# Patient Record
Sex: FEMALE | Race: BLACK OR AFRICAN AMERICAN | NOT HISPANIC OR LATINO | Employment: UNEMPLOYED | URBAN - METROPOLITAN AREA
[De-identification: names, ages, dates, MRNs, and addresses within clinical notes are randomized per-mention and may not be internally consistent; named-entity substitution may affect disease eponyms.]

---

## 2017-02-13 ENCOUNTER — HISTORICAL (OUTPATIENT)
Dept: LAB | Facility: HOSPITAL | Age: 22
End: 2017-02-13

## 2018-10-23 ENCOUNTER — HISTORICAL (OUTPATIENT)
Dept: LAB | Facility: HOSPITAL | Age: 23
End: 2018-10-23

## 2018-11-20 ENCOUNTER — HISTORICAL (OUTPATIENT)
Dept: LAB | Facility: HOSPITAL | Age: 23
End: 2018-11-20

## 2018-12-19 ENCOUNTER — HISTORICAL (OUTPATIENT)
Dept: LAB | Facility: HOSPITAL | Age: 23
End: 2018-12-19

## 2019-01-24 ENCOUNTER — HISTORICAL (OUTPATIENT)
Dept: ADMINISTRATIVE | Facility: HOSPITAL | Age: 24
End: 2019-01-24

## 2019-01-24 LAB
ABS NEUT (OLG): 8.85 X10(3)/MCL (ref 2.1–9.2)
BASOPHILS # BLD AUTO: 0 X10(3)/MCL (ref 0–0.2)
BASOPHILS NFR BLD AUTO: 0 %
EOSINOPHIL # BLD AUTO: 0.1 X10(3)/MCL (ref 0–0.9)
EOSINOPHIL NFR BLD AUTO: 1 %
ERYTHROCYTE [DISTWIDTH] IN BLOOD BY AUTOMATED COUNT: 13.6 % (ref 11.5–17)
GLUCOSE 1H P 100 G GLC PO SERPL-MCNC: 103 MG/DL (ref 100–180)
HCT VFR BLD AUTO: 35.2 % (ref 37–47)
HGB BLD-MCNC: 11.1 GM/DL (ref 12–16)
LYMPHOCYTES # BLD AUTO: 1 X10(3)/MCL (ref 0.6–4.6)
LYMPHOCYTES NFR BLD AUTO: 10 %
MCH RBC QN AUTO: 27.4 PG (ref 27–31)
MCHC RBC AUTO-ENTMCNC: 31.5 GM/DL (ref 33–36)
MCV RBC AUTO: 86.9 FL (ref 80–94)
MONOCYTES # BLD AUTO: 0.7 X10(3)/MCL (ref 0.1–1.3)
MONOCYTES NFR BLD AUTO: 6 %
NEUTROPHILS # BLD AUTO: 8.85 X10(3)/MCL (ref 2.1–9.2)
NEUTROPHILS NFR BLD AUTO: 82 %
PLATELET # BLD AUTO: 265 X10(3)/MCL (ref 130–400)
PMV BLD AUTO: 10.9 FL (ref 9.4–12.4)
RBC # BLD AUTO: 4.05 X10(6)/MCL (ref 4.2–5.4)
WBC # SPEC AUTO: 10.7 X10(3)/MCL (ref 4.5–11.5)

## 2019-03-12 ENCOUNTER — HISTORICAL (OUTPATIENT)
Dept: LAB | Facility: HOSPITAL | Age: 24
End: 2019-03-12

## 2019-03-12 LAB — T PALLIDUM AB SER QL: NONREACTIVE

## 2019-04-09 ENCOUNTER — HISTORICAL (OUTPATIENT)
Dept: LAB | Facility: HOSPITAL | Age: 24
End: 2019-04-09

## 2019-04-09 LAB
AMPHET UR QL SCN: ABNORMAL
BARBITURATE SCN PRESENT UR: ABNORMAL
BENZODIAZ UR QL SCN: ABNORMAL
CANNABINOIDS UR QL SCN: ABNORMAL
COCAINE UR QL SCN: ABNORMAL
OPIATES UR QL SCN: ABNORMAL
PCP UR QL: ABNORMAL
PH UR STRIP.AUTO: 8 [PH] (ref 5–7.5)
SP GR FLD REFRACTOMETRY: 1.02 (ref 1–1.03)

## 2022-04-07 ENCOUNTER — HISTORICAL (OUTPATIENT)
Dept: ADMINISTRATIVE | Facility: HOSPITAL | Age: 27
End: 2022-04-07

## 2022-04-23 VITALS
WEIGHT: 165.38 LBS | DIASTOLIC BLOOD PRESSURE: 93 MMHG | HEIGHT: 64 IN | BODY MASS INDEX: 28.24 KG/M2 | SYSTOLIC BLOOD PRESSURE: 138 MMHG

## 2022-05-17 ENCOUNTER — HOSPITAL ENCOUNTER (EMERGENCY)
Facility: HOSPITAL | Age: 27
Discharge: HOME | End: 2022-05-17
Attending: STUDENT IN AN ORGANIZED HEALTH CARE EDUCATION/TRAINING PROGRAM
Payer: MEDICAID

## 2022-05-17 VITALS
TEMPERATURE: 98 F | HEIGHT: 64 IN | HEART RATE: 67 BPM | OXYGEN SATURATION: 100 % | WEIGHT: 204.81 LBS | SYSTOLIC BLOOD PRESSURE: 139 MMHG | RESPIRATION RATE: 18 BRPM | DIASTOLIC BLOOD PRESSURE: 82 MMHG | BODY MASS INDEX: 34.97 KG/M2

## 2022-05-17 DIAGNOSIS — Z48.02 VISIT FOR SUTURE REMOVAL: Primary | ICD-10-CM

## 2022-05-17 PROCEDURE — 99281 EMR DPT VST MAYX REQ PHY/QHP: CPT | Mod: 25

## 2022-05-17 NOTE — ED NOTES
Sutures to palmar aspect of right hand appear to be well healed and no signs/symptoms of infection

## 2022-05-17 NOTE — ED PROVIDER NOTES
Encounter Date: 5/17/2022       History     Chief Complaint   Patient presents with    Suture / Staple Removal     Requesting suture removal to right palm. States have been in place approx 3 weeks.      Patient presents for suture removal; she denies any bleeding/drainage/fever    The history is provided by the patient.   Suture / Staple Removal   The sutures were placed more than 14 days ago. Treatments since wound repair include regular soap and water washings. There has been no drainage from the wound. There is no redness present. There is no swelling present. There is no pain present. She has no difficulty moving the affected extremity or digit.     Review of patient's allergies indicates:   Allergen Reactions    Sulfamethoxazole-trimethoprim Rash     No past medical history on file.  No past surgical history on file.  No family history on file.     Review of Systems   Constitutional: Negative for fever.   HENT: Negative for sore throat.    Eyes: Negative.    Respiratory: Negative for shortness of breath.    Cardiovascular: Negative for chest pain.   Gastrointestinal: Negative for nausea.   Genitourinary: Negative for dysuria.   Musculoskeletal: Negative for back pain.   Skin: Negative for rash.   Neurological: Negative for weakness.   Hematological: Does not bruise/bleed easily.   Psychiatric/Behavioral: Negative.        Physical Exam     Initial Vitals [05/17/22 1534]   BP Pulse Resp Temp SpO2   139/82 67 18 98.4 °F (36.9 °C) 100 %      MAP       --         Physical Exam    Constitutional: She appears well-developed and well-nourished.   HENT:   Head: Normocephalic and atraumatic.   Eyes: EOM are normal.   Neck: Neck supple.   Normal range of motion.  Cardiovascular: Normal rate and regular rhythm.   Pulmonary/Chest: Breath sounds normal.   Abdominal: Abdomen is soft. Bowel sounds are normal.   Musculoskeletal:         General: Normal range of motion.      Right hand: Laceration present.      Cervical back:  Normal range of motion and neck supple.      Comments: Healed laceration site; sutures in place; all removed without complication     Neurological: She is alert and oriented to person, place, and time.   Skin: Skin is warm and dry.   Psychiatric: She has a normal mood and affect. Her behavior is normal. Judgment and thought content normal.         ED Course   Procedures  Labs Reviewed - No data to display       Imaging Results    None          Medications - No data to display                       Clinical Impression:   Final diagnoses:  [Z48.02] Visit for suture removal (Primary)          ED Disposition Condition    Discharge Stable        ED Prescriptions     None        Follow-up Information     Follow up With Specialties Details Why Contact Info    discharge info    Discussed all pertinent ED information, results, diagnosis and treatment plan; All questions and concerns were addressed at this time. Patient voices understanding of information and instructions. Patient is comfortable with plan and discharge    discharge followup    If your symptoms become WORSE or you DO NOT IMPROVE and you are unable to reach your health care provider, you should RETURN to the emergency department           MARISOL Waters  05/17/22 2317

## 2022-08-03 ENCOUNTER — OFFICE VISIT (OUTPATIENT)
Dept: URGENT CARE | Facility: CLINIC | Age: 27
End: 2022-08-03
Payer: MEDICAID

## 2022-08-03 ENCOUNTER — TELEPHONE (OUTPATIENT)
Dept: URGENT CARE | Facility: CLINIC | Age: 27
End: 2022-08-03

## 2022-08-03 VITALS
OXYGEN SATURATION: 100 % | DIASTOLIC BLOOD PRESSURE: 87 MMHG | SYSTOLIC BLOOD PRESSURE: 129 MMHG | BODY MASS INDEX: 32.9 KG/M2 | RESPIRATION RATE: 16 BRPM | HEIGHT: 64 IN | HEART RATE: 60 BPM | WEIGHT: 192.69 LBS | TEMPERATURE: 98 F

## 2022-08-03 DIAGNOSIS — R10.9 ABDOMINAL PAIN, UNSPECIFIED ABDOMINAL LOCATION: ICD-10-CM

## 2022-08-03 DIAGNOSIS — N89.8 VAGINAL DISCHARGE: ICD-10-CM

## 2022-08-03 DIAGNOSIS — Z20.2 ENCOUNTER FOR ASSESSMENT OF STD EXPOSURE: Primary | ICD-10-CM

## 2022-08-03 LAB
B-HCG UR QL: NEGATIVE
BILIRUB UR QL STRIP: NEGATIVE
C TRACH DNA SPEC QL NAA+PROBE: NOT DETECTED
CLUE CELLS VAG QL WET PREP: ABNORMAL
CTP QC/QA: YES
GLUCOSE UR QL STRIP: NEGATIVE
HIV 1+2 AB+HIV1 P24 AG SERPL QL IA: NONREACTIVE
KETONES UR QL STRIP: NEGATIVE
LEUKOCYTE ESTERASE UR QL STRIP: NEGATIVE
N GONORRHOEA DNA SPEC QL NAA+PROBE: NOT DETECTED
PH, POC UA: 8.5
POC BLOOD, URINE: NEGATIVE
POC NITRATES, URINE: NEGATIVE
PROT UR QL STRIP: NEGATIVE
SP GR UR STRIP: 1.02 (ref 1–1.03)
T PALLIDUM AB SER QL: NONREACTIVE
T VAGINALIS VAG QL WET PREP: ABNORMAL
UROBILINOGEN UR STRIP-ACNC: 0.2 (ref 0.1–1.1)
WBC #/AREA VAG WET PREP: ABNORMAL
YEAST SPEC QL WET PREP: ABNORMAL

## 2022-08-03 PROCEDURE — 99214 OFFICE O/P EST MOD 30 MIN: CPT | Mod: PBBFAC

## 2022-08-03 PROCEDURE — 81003 URINALYSIS AUTO W/O SCOPE: CPT | Mod: PBBFAC

## 2022-08-03 PROCEDURE — 99213 OFFICE O/P EST LOW 20 MIN: CPT | Mod: S$PBB,,,

## 2022-08-03 PROCEDURE — 87491 CHLMYD TRACH DNA AMP PROBE: CPT

## 2022-08-03 PROCEDURE — 81025 URINE PREGNANCY TEST: CPT | Mod: PBBFAC

## 2022-08-03 PROCEDURE — 99213 PR OFFICE/OUTPT VISIT, EST, LEVL III, 20-29 MIN: ICD-10-PCS | Mod: S$PBB,,,

## 2022-08-03 PROCEDURE — 86780 TREPONEMA PALLIDUM: CPT

## 2022-08-03 PROCEDURE — 87210 SMEAR WET MOUNT SALINE/INK: CPT | Mod: 59

## 2022-08-03 PROCEDURE — 87389 HIV-1 AG W/HIV-1&-2 AB AG IA: CPT

## 2022-08-03 PROCEDURE — 87591 N.GONORRHOEAE DNA AMP PROB: CPT

## 2022-08-03 PROCEDURE — 36415 COLL VENOUS BLD VENIPUNCTURE: CPT

## 2022-08-03 RX ORDER — METRONIDAZOLE 500 MG/1
500 TABLET ORAL EVERY 12 HOURS
Qty: 14 TABLET | Refills: 0 | Status: SHIPPED | OUTPATIENT
Start: 2022-08-03 | End: 2022-08-10

## 2022-08-03 NOTE — TELEPHONE ENCOUNTER
----- Message from Tiana Dobbins NP sent at 8/3/2022  1:17 PM CDT -----  Please inform patient she has BV and that flagyl was sent to her pharmacy.  Thanks.     Bacterial Vaginosis is not an STD. It is a pH imbalance in the vagina that can cause discharge, odor, discomfort. Metronidazole (Flagyl) has been sent to your pharmacy. Take it twice a day for 7 days. Do not take bubble baths, use bath bombs, douches, or any scented tampons/pads. Do not drink any alcohol while on this medication and for 3 days after completing it, as that will cause violent illness.

## 2022-08-03 NOTE — PROGRESS NOTES
Please inform patient she has BV and that flagyl was sent to her pharmacy.  Thanks.     Bacterial Vaginosis is not an STD. It is a pH imbalance in the vagina that can cause discharge, odor, discomfort. Metronidazole (Flagyl) has been sent to your pharmacy. Take it twice a day for 7 days. Do not take bubble baths, use bath bombs, douches, or any scented tampons/pads. Do not drink any alcohol while on this medication and for 3 days after completing it, as that will cause violent illness.

## 2022-08-03 NOTE — PROGRESS NOTES
"Subjective:       Patient ID: Rosanne Dai is a 27 y.o. female.    Vitals:  height is 5' 4.17" (1.63 m) and weight is 87.4 kg (192 lb 11.2 oz). Her temperature is 98.4 °F (36.9 °C). Her blood pressure is 129/87 and her pulse is 60. Her respiration is 16 and oxygen saturation is 100%.     Chief Complaint: Requesting STI testing. (Abd pain, vaginal discharge (yellowish, thin) since 7/13. Pt requesting blood/urine panels.)    27 year old female with lower pelvic/abdominal pain and yellow vaginal discharge since 7/13. States recent miscarriage and UTI that was treated last month, denies current UTI symptoms. Denies fever or CVA tenderness. States LMP was 7/20.       Constitution: Negative.   HENT: Negative.    Neck: neck negative.   Cardiovascular: Negative.    Eyes: Negative.    Respiratory: Negative.    Gastrointestinal: Negative.    Endocrine: negative.   Genitourinary: Positive for vaginal pain, vaginal discharge and pelvic pain. Negative for dysuria, frequency and urgency.   Musculoskeletal: Negative.    Skin: Negative.        Objective:      Physical Exam   HENT:   Head: Normocephalic.   Nose: Nose normal.   Mouth/Throat: Mucous membranes are moist. Oropharynx is clear.   Eyes: Pupils are equal, round, and reactive to light.   Cardiovascular: Normal rate, regular rhythm and normal pulses.   Pulmonary/Chest: Effort normal.   Abdominal: Normal appearance. Soft.   Genitourinary:    Vulva normal.   Cervix exhibits discharge.    Vaginal discharge present.           Musculoskeletal: Normal range of motion.         General: Normal range of motion.   Neurological: She is alert.   Skin: Skin is warm and dry.   Vitals reviewed.chaperone present (DEION Estevez LPN)       Results for orders placed or performed in visit on 08/03/22   POCT urine pregnancy   Result Value Ref Range    POC Preg Test, Ur Negative Negative     Acceptable Yes    POCT Urinalysis, Dipstick, Automated, W/O Scope   Result Value Ref Range    " POC Blood, Urine Negative Negative    POC Bilirubin, Urine Negative Negative    POC Urobilinogen, Urine 0.2 0.1 - 1.1    POC Ketones, Urine Negative Negative    POC Protein, Urine Negative Negative    POC Nitrates, Urine Negative Negative    POC Glucose, Urine Negative Negative    pH, UA 8.5     POC Specific Gravity, Urine 1.020 1.003 - 1.029    POC Leukocytes, Urine Negative Negative           Assessment:       1. Encounter for assessment of STD exposure    2. Vaginal discharge    3. Abdominal pain, unspecified abdominal location          Plan:         Encounter for assessment of STD exposure  -     POCT urine pregnancy  -     HIV 1/2 Ag/Ab (4th Gen)  -     SYPHILIS ANTIBODY (WITH REFLEX RPR)  -     Trichomonas Vaginalis, RENATA  -     Chlamydia/GC, PCR  -     Wet Prep, Genital    Vaginal discharge  -     POCT urine pregnancy  -     HIV 1/2 Ag/Ab (4th Gen)  -     SYPHILIS ANTIBODY (WITH REFLEX RPR)  -     Trichomonas Vaginalis, RENATA  -     Chlamydia/GC, PCR  -     Wet Prep, Genital    Abdominal pain, unspecified abdominal location  -     POCT Urinalysis, Dipstick, Automated, W/O Scope          - abstain from sex until all results are known  - urine tests take 5-7 days to result  - vaginal swab tests are a same day result  - blood tests are a same day result  - this clinic will ONLY call you for POSITIVE = ABNORMAL results. We will not call you to tell you tests are NEGATIVE = NORMAL.  - if you need medication to treat any conditions, it was either prescribed to you today and sent to your pharmacy, or it will be sent when providers view abnormal results.  - if any of your tests are abnormal, we will call you with a plan of care.  - always require condoms  - partners will need treatment for any +STDs on your testing. They have to have their own visit, we do not automatically treat them.    Please see provided patient education for guidance.    Go to the ER if you experience chest pain with SOB, SOBOE, high fevers 103+,  excessive vomiting/diarrhea, or general distress.

## 2022-08-04 LAB
SPECIMEN SOURCE: NORMAL
T VAGINALIS RRNA SPEC QL NAA+PROBE: NEGATIVE

## 2022-11-28 ENCOUNTER — HOSPITAL ENCOUNTER (EMERGENCY)
Facility: HOSPITAL | Age: 27
Discharge: HOME | End: 2022-11-28
Attending: FAMILY MEDICINE
Payer: MEDICAID

## 2022-11-28 VITALS
HEIGHT: 63 IN | WEIGHT: 160 LBS | SYSTOLIC BLOOD PRESSURE: 103 MMHG | BODY MASS INDEX: 28.35 KG/M2 | RESPIRATION RATE: 16 BRPM | DIASTOLIC BLOOD PRESSURE: 62 MMHG | OXYGEN SATURATION: 100 % | TEMPERATURE: 98 F | HEART RATE: 55 BPM

## 2022-11-28 DIAGNOSIS — N20.1 URETEROLITHIASIS: Primary | ICD-10-CM

## 2022-11-28 LAB
ABS NEUT CALC (OHS): 7.22 X10(3)/MCL (ref 2.1–9.2)
ALBUMIN SERPL-MCNC: 3.7 GM/DL (ref 3.5–5)
ALBUMIN/GLOB SERPL: 1.3 RATIO (ref 1.1–2)
ALP SERPL-CCNC: 69 UNIT/L (ref 40–150)
ALT SERPL-CCNC: 15 UNIT/L (ref 0–55)
ANISOCYTOSIS BLD QL SMEAR: ABNORMAL
APPEARANCE UR: CLEAR
AST SERPL-CCNC: 14 UNIT/L (ref 5–34)
B-HCG UR QL: NEGATIVE
BACTERIA #/AREA URNS AUTO: ABNORMAL /HPF
BILIRUB UR QL STRIP.AUTO: NEGATIVE MG/DL
BILIRUBIN DIRECT+TOT PNL SERPL-MCNC: 0.3 MG/DL
BUN SERPL-MCNC: 7.5 MG/DL (ref 7–18.7)
CALCIUM SERPL-MCNC: 9.2 MG/DL (ref 8.4–10.2)
CHLORIDE SERPL-SCNC: 106 MMOL/L (ref 98–107)
CO2 SERPL-SCNC: 25 MMOL/L (ref 22–29)
COLOR UR AUTO: ABNORMAL
CREAT SERPL-MCNC: 0.67 MG/DL (ref 0.55–1.02)
CTP QC/QA: YES
EOSINOPHIL NFR BLD MANUAL: 0.08 X10(3)/MCL (ref 0–0.9)
EOSINOPHIL NFR BLD MANUAL: 1 % (ref 0–8)
ERYTHROCYTE [DISTWIDTH] IN BLOOD BY AUTOMATED COUNT: 15.4 % (ref 11.5–17)
GFR SERPLBLD CREATININE-BSD FMLA CKD-EPI: >60 MLS/MIN/1.73/M2
GLOBULIN SER-MCNC: 2.8 GM/DL (ref 2.4–3.5)
GLUCOSE SERPL-MCNC: 98 MG/DL (ref 74–100)
GLUCOSE UR QL STRIP.AUTO: NORMAL MG/DL
HCT VFR BLD AUTO: 33.4 % (ref 37–47)
HGB BLD-MCNC: 10.5 GM/DL (ref 12–16)
IMM GRANULOCYTES # BLD AUTO: 0.03 X10(3)/MCL (ref 0–0.04)
IMM GRANULOCYTES NFR BLD AUTO: 0.4 %
KETONES UR QL STRIP.AUTO: NEGATIVE MG/DL
LEUKOCYTE ESTERASE UR QL STRIP.AUTO: NEGATIVE UNIT/L
LYMPHOCYTES NFR BLD MANUAL: 0.91 X10(3)/MCL
LYMPHOCYTES NFR BLD MANUAL: 11 % (ref 13–40)
MCH RBC QN AUTO: 25.8 PG (ref 27–31)
MCHC RBC AUTO-ENTMCNC: 31.4 MG/DL (ref 33–36)
MCV RBC AUTO: 82.1 FL (ref 80–94)
MONOCYTES NFR BLD MANUAL: 0.08 X10(3)/MCL (ref 0.1–1.3)
MONOCYTES NFR BLD MANUAL: 1 % (ref 2–11)
MUCOUS THREADS URNS QL MICRO: ABNORMAL /LPF
NEUTROPHILS NFR BLD MANUAL: 87 % (ref 47–80)
NITRITE UR QL STRIP.AUTO: NEGATIVE
NRBC BLD AUTO-RTO: 0 %
PH UR STRIP.AUTO: 6 [PH]
PLATELET # BLD AUTO: 294 X10(3)/MCL (ref 130–400)
PLATELET # BLD EST: NORMAL 10*3/UL
PMV BLD AUTO: 11.5 FL (ref 7.4–10.4)
POTASSIUM SERPL-SCNC: 2.9 MMOL/L (ref 3.5–5.1)
PROT SERPL-MCNC: 6.5 GM/DL (ref 6.4–8.3)
PROT UR QL STRIP.AUTO: ABNORMAL MG/DL
RBC # BLD AUTO: 4.07 X10(6)/MCL (ref 4.2–5.4)
RBC #/AREA URNS AUTO: ABNORMAL /HPF
RBC MORPH BLD: ABNORMAL
RBC UR QL AUTO: ABNORMAL UNIT/L
SODIUM SERPL-SCNC: 140 MMOL/L (ref 136–145)
SP GR UR STRIP.AUTO: 1.01
SQUAMOUS #/AREA URNS LPF: ABNORMAL /[LPF]
UROBILINOGEN UR STRIP-ACNC: NORMAL MG/DL
WBC # SPEC AUTO: 8.3 X10(3)/MCL (ref 4.5–11.5)
WBC #/AREA URNS AUTO: ABNORMAL /HPF

## 2022-11-28 PROCEDURE — 96360 HYDRATION IV INFUSION INIT: CPT

## 2022-11-28 PROCEDURE — 85007 BL SMEAR W/DIFF WBC COUNT: CPT | Performed by: FAMILY MEDICINE

## 2022-11-28 PROCEDURE — 99285 EMERGENCY DEPT VISIT HI MDM: CPT | Mod: 25

## 2022-11-28 PROCEDURE — 25000003 PHARM REV CODE 250: Performed by: FAMILY MEDICINE

## 2022-11-28 PROCEDURE — 80053 COMPREHEN METABOLIC PANEL: CPT | Performed by: FAMILY MEDICINE

## 2022-11-28 PROCEDURE — 81001 URINALYSIS AUTO W/SCOPE: CPT | Performed by: FAMILY MEDICINE

## 2022-11-28 PROCEDURE — 81025 URINE PREGNANCY TEST: CPT | Performed by: FAMILY MEDICINE

## 2022-11-28 RX ORDER — ONDANSETRON 4 MG/1
4 TABLET, ORALLY DISINTEGRATING ORAL EVERY 6 HOURS PRN
Qty: 10 TABLET | Refills: 0 | Status: SHIPPED | OUTPATIENT
Start: 2022-11-28 | End: 2022-12-03

## 2022-11-28 RX ORDER — POTASSIUM CHLORIDE 20 MEQ/1
40 TABLET, EXTENDED RELEASE ORAL
Status: COMPLETED | OUTPATIENT
Start: 2022-11-28 | End: 2022-11-28

## 2022-11-28 RX ORDER — ASPIRIN 325 MG
50000 TABLET, DELAYED RELEASE (ENTERIC COATED) ORAL
COMMUNITY
Start: 2022-11-08

## 2022-11-28 RX ORDER — KETOROLAC TROMETHAMINE 10 MG/1
10 TABLET, FILM COATED ORAL EVERY 6 HOURS PRN
Qty: 20 TABLET | Refills: 0 | Status: SHIPPED | OUTPATIENT
Start: 2022-11-28 | End: 2022-12-03

## 2022-11-28 RX ORDER — SERTRALINE HYDROCHLORIDE 100 MG/1
200 TABLET, FILM COATED ORAL
COMMUNITY
Start: 2022-10-11 | End: 2023-09-02

## 2022-11-28 RX ADMIN — POTASSIUM CHLORIDE 40 MEQ: 1500 TABLET, EXTENDED RELEASE ORAL at 04:11

## 2022-11-28 RX ADMIN — SODIUM CHLORIDE 1000 ML: 9 INJECTION, SOLUTION INTRAVENOUS at 01:11

## 2022-11-28 NOTE — Clinical Note
"Rosanne Delatorre" Suhas was seen and treated in our emergency department on 11/28/2022.  She may return to work on 11/29/2022.       If you have any questions or concerns, please don't hesitate to call.      ROSALVA Alves RN RN    "

## 2022-11-28 NOTE — ED PROVIDER NOTES
Encounter Date: 11/28/2022       History     Chief Complaint   Patient presents with    Back Pain     Reports with right lower back pain. States she feels like she needs to urinate but is unable. Steady gait. 20 g IV started and 15mg of toradol iv.     27-year-old female emergency with bright.  Began acutely 30 minutes prior to arrival.  Patient reports health prior to this.  Patient reports having for pain right flank with radiation right lower.  Patient also reports that she is having urinating.  Dysuria patient received Toradol 15 mg IV the with improvement    The history is provided by the patient.   Review of patient's allergies indicates:   Allergen Reactions    Sulfamethoxazole-trimethoprim Rash     No past medical history on file.  No past surgical history on file.  No family history on file.  Social History     Tobacco Use    Smoking status: Every Day     Types: Vaping with nicotine    Smokeless tobacco: Never   Substance Use Topics    Alcohol use: Yes    Drug use: Yes     Types: Marijuana     Review of Systems   Constitutional:  Negative for chills, fatigue and fever.   HENT:  Negative for ear pain, rhinorrhea and sore throat.    Eyes:  Negative for photophobia and pain.   Respiratory:  Negative for cough, shortness of breath and wheezing.    Cardiovascular:  Negative for chest pain.   Gastrointestinal:  Positive for abdominal pain and nausea. Negative for diarrhea and vomiting.   Genitourinary:  Positive for flank pain. Negative for dysuria.   Neurological:  Negative for dizziness, weakness and headaches.   All other systems reviewed and are negative.    Physical Exam     Initial Vitals [11/28/22 0120]   BP Pulse Resp Temp SpO2   119/78 (!) 56 16 97.9 °F (36.6 °C) 100 %      MAP       --         Physical Exam    Nursing note and vitals reviewed.  Constitutional: She appears well-developed and well-nourished. No distress.   HENT:   Head: Normocephalic and atraumatic.   Eyes: Conjunctivae and EOM are  normal. Pupils are equal, round, and reactive to light.   Neck: Neck supple.   Normal range of motion.  Cardiovascular:  Normal rate, regular rhythm, normal heart sounds and intact distal pulses.           Pulmonary/Chest: Breath sounds normal. No respiratory distress. She has no wheezes. She has no rhonchi. She has no rales.   Abdominal: Abdomen is soft. Bowel sounds are normal. She exhibits no distension. There is no abdominal tenderness. There is no rebound and no guarding.   Musculoskeletal:         General: Normal range of motion.      Cervical back: Normal range of motion and neck supple.     Neurological: She is alert and oriented to person, place, and time.   Skin: Skin is warm and dry. Capillary refill takes less than 2 seconds. No erythema.   Psychiatric: She has a normal mood and affect. Her behavior is normal. Judgment and thought content normal.       ED Course   Procedures  Labs Reviewed   URINALYSIS, REFLEX TO URINE CULTURE - Abnormal; Notable for the following components:       Result Value    Protein, UA Trace (*)     Blood, UA 3+ (*)     Mucous, UA Small (*)     RBC, UA 21-50 (*)     All other components within normal limits   COMPREHENSIVE METABOLIC PANEL - Abnormal; Notable for the following components:    Potassium Level 2.9 (*)     All other components within normal limits   CBC WITH DIFFERENTIAL - Abnormal; Notable for the following components:    RBC 4.07 (*)     Hgb 10.5 (*)     Hct 33.4 (*)     MCH 25.8 (*)     MCHC 31.4 (*)     MPV 11.5 (*)     All other components within normal limits   MANUAL DIFFERENTIAL - Abnormal; Notable for the following components:    Neut Man 87 (*)     Lymph Man 11 (*)     Monocyte Man 1 (*)     Abs Mono 0.083 (*)     RBC Morph Abnormal (*)     Anisocyte 1+ (*)     All other components within normal limits   CBC W/ AUTO DIFFERENTIAL    Narrative:     The following orders were created for panel order CBC Auto Differential.  Procedure                                Abnormality         Status                     ---------                               -----------         ------                     CBC with Differential[[id number]]        Abnormal            Final result               Manual Differential[[id number]]          Abnormal            Final result                 Please view results for these tests on the individual orders.   RAINBOW DRAW    Narrative:     The following orders were created for panel order [facility] Draw.  Procedure                               Abnormality         Status                     ---------                               -----------         ------                     Light Blue Top Hold[[id number]]                              In process                 Red Top Hold[[id number]]                                     In process                 Gold Top Hold[[id number]]                                    In process                   Please view results for these tests on the individual orders.   LIGHT BLUE TOP HOLD   RED TOP HOLD   GOLD TOP HOLD   POCT URINE PREGNANCY          Imaging Results              CT Abdomen Pelvis  Without Contrast (Preliminary result)  Result time 11/28/22 04:37:03      Preliminary result by Mihai Waldrop Jr., MD (11/28/22 04:37:03)                   Narrative:    START OF REPORT:  Technique: CT of the abdomen and pelvis was performed with axial images as well as sagittal and coronal reconstruction images without intravenous contrast.    Comparison: Comparison is with study [id number] 04:21:00.    Clinical History: Reports with right lower back pain. States she feels like she needs to urinate but is unable. Steady gait. 20 g IV started and 15mg of toradol iv.    Dosage Information: Automated Exposure Control was utilized.    Findings:  Thorax:  Lungs: The visualized lung bases appear unremarkable.  Pleura: No effusions or thickening are seen.  Heart: The heart size is within normal limits.  Abdomen:  Abdominal Wall:  No abdominal wall pathology is seen.  Liver: The liver appears unremarkable.  Biliary System: No intrahepatic or extrahepatic biliary duct dilatation is seen.  Gallbladder: The gallbladder appears unremarkable.  Pancreas: The pancreas appears unremarkable.  Spleen: The spleen appears unremarkable.  Adrenals: The adrenal glands appear unremarkable.  Kidneys: The left kidney appears unremarkable with no stones cysts masses or hydronephrosis. There is a borderline right hydroureteronephrosis. No ureteral stone is seen. A 3 mm stone in the bladder (series 2; image 81) likely recently passed from ureter. This is new since the prior examination. No renal calculus is identified.  Aorta: The abdominal aorta appears unremarkable.  IVC: Unremarkable.  Bowel:  Esophagus: The visualized esophagus appears unremarkable.  Stomach: The stomach appears unremarkable.  Duodenum: Unremarkable appearing duodenum.  Small Bowel: The small bowel appears unremarkable.  Colon: Nondistended.  Appendix: The appendix appears unremarkable.  Peritoneum: No free intraperitoneal air is seen. Trace free fluid is seen in the pelvis. This is likely physiologic.    Pelvis:  Bladder: The bladder appears unremarkable.  Female:  Uterus: The uterus appears unremarkable.  Ovaries: No adnexal masses are seen.    Bony structures:  Dorsal Spine: The visualized dorsal spine appears unremarkable.      Impression:  1. There is a borderline right hydroureteronephrosis. No ureteral stone is seen. A 3 mm stone in the bladder (series 2; image 81) likely recently passed from ureter.  2. Details and other findings as discussed above.                          Preliminary result by Interface, Rad Results In (11/28/22 04:37:03)                   Narrative:    START OF REPORT:  Technique: CT of the abdomen and pelvis was performed with axial images as well as sagittal and coronal reconstruction images without intravenous contrast.    Comparison: Comparison is with study  [id number] 04:21:00.    Clinical History: Reports with right lower back pain. States she feels like she needs to urinate but is unable. Steady gait. 20 g IV started and 15mg of toradol iv.    Dosage Information: Automated Exposure Control was utilized.    Findings:  Thorax:  Lungs: The visualized lung bases appear unremarkable.  Pleura: No effusions or thickening are seen.  Heart: The heart size is within normal limits.  Abdomen:  Abdominal Wall: No abdominal wall pathology is seen.  Liver: The liver appears unremarkable.  Biliary System: No intrahepatic or extrahepatic biliary duct dilatation is seen.  Gallbladder: The gallbladder appears unremarkable.  Pancreas: The pancreas appears unremarkable.  Spleen: The spleen appears unremarkable.  Adrenals: The adrenal glands appear unremarkable.  Kidneys: The left kidney appears unremarkable with no stones cysts masses or hydronephrosis. There is a borderline right hydroureteronephrosis. No ureteral stone is seen. A 3 mm stone in the bladder (series 2; image 81) likely recently passed from ureter. This is new since the prior examination. No renal calculus is identified.  Aorta: The abdominal aorta appears unremarkable.  IVC: Unremarkable.  Bowel:  Esophagus: The visualized esophagus appears unremarkable.  Stomach: The stomach appears unremarkable.  Duodenum: Unremarkable appearing duodenum.  Small Bowel: The small bowel appears unremarkable.  Colon: Nondistended.  Appendix: The appendix appears unremarkable.  Peritoneum: No free intraperitoneal air is seen. Trace free fluid is seen in the pelvis. This is likely physiologic.    Pelvis:  Bladder: The bladder appears unremarkable.  Female:  Uterus: The uterus appears unremarkable.  Ovaries: No adnexal masses are seen.    Bony structures:  Dorsal Spine: The visualized dorsal spine appears unremarkable.      Impression:  1. There is a borderline right hydroureteronephrosis. No ureteral stone is seen. A 3 mm stone in the  bladder (series 2; image 81) likely recently passed from ureter.  2. Details and other findings as discussed above.                                         Medications   sodium chloride 0.9% bolus 1,000 mL (0 mLs Intravenous Stopped 11/28/22 0233)   potassium chloride SA CR tablet 40 mEq (40 mEq Oral Given 11/28/22 0415)     Medical Decision Making:   Differential Diagnosis:   Abdominal pain, ureterolithiasis, acute cystitis           ED Course as of 11/28/22 0508 Mon Nov 28, 2022   0410 Feeling improved; stable for discharge to home.  ER precautions for any acute worsening. [MW]      ED Course User Index  [MW] Marcin Hollingsworth MD                 Clinical Impression:   Final diagnoses:  [N20.1] Ureterolithiasis (Primary)      ED Disposition Condition    Discharge Stable          ED Prescriptions       Medication Sig Dispense Start Date End Date Auth. Provider    ketorolac (TORADOL) 10 mg tablet Take 1 tablet (10 mg total) by mouth every 6 (six) hours as needed for Pain. 20 tablet 11/28/2022 12/3/2022 Marcin Hollingsworth MD    ondansetron (ZOFRAN-ODT) 4 MG TbDL Take 1 tablet (4 mg total) by mouth every 6 (six) hours as needed (nausea vomiting). 10 tablet 11/28/2022 12/3/2022 Marcin Hollingsworth MD          Follow-up Information       Follow up With Specialties Details Why Contact Info    [facility] - Emergency Dept Emergency Medicine  As needed, If symptoms worsen [address] [facility] [id number] [phone]    Primary Care Physician  In 5 days               Marcin Hollingsworth MD  11/28/22 0411       Marcin Hollingsworth MD  11/28/22 6016

## 2023-05-24 ENCOUNTER — HOSPITAL ENCOUNTER (EMERGENCY)
Facility: HOSPITAL | Age: 28
Discharge: HOME | End: 2023-05-24
Attending: STUDENT IN AN ORGANIZED HEALTH CARE EDUCATION/TRAINING PROGRAM
Payer: MEDICAID

## 2023-05-24 VITALS
RESPIRATION RATE: 16 BRPM | TEMPERATURE: 98 F | WEIGHT: 147.69 LBS | DIASTOLIC BLOOD PRESSURE: 95 MMHG | SYSTOLIC BLOOD PRESSURE: 149 MMHG | HEART RATE: 67 BPM | HEIGHT: 64 IN | OXYGEN SATURATION: 100 % | BODY MASS INDEX: 25.21 KG/M2

## 2023-05-24 DIAGNOSIS — R51.9 NONINTRACTABLE HEADACHE, UNSPECIFIED CHRONICITY PATTERN, UNSPECIFIED HEADACHE TYPE: ICD-10-CM

## 2023-05-24 DIAGNOSIS — R20.2 PARESTHESIAS: ICD-10-CM

## 2023-05-24 DIAGNOSIS — N76.0 BACTERIAL VAGINOSIS: Primary | ICD-10-CM

## 2023-05-24 DIAGNOSIS — B96.89 BACTERIAL VAGINOSIS: Primary | ICD-10-CM

## 2023-05-24 LAB
ALBUMIN SERPL-MCNC: 4.7 G/DL (ref 3.5–5)
ALBUMIN/GLOB SERPL: 1.4 RATIO (ref 1.1–2)
ALP SERPL-CCNC: 83 UNIT/L (ref 40–150)
ALT SERPL-CCNC: 11 UNIT/L (ref 0–55)
APPEARANCE UR: ABNORMAL
AST SERPL-CCNC: 13 UNIT/L (ref 5–34)
B-HCG SERPL QL: NEGATIVE
BACTERIA #/AREA URNS AUTO: ABNORMAL /HPF
BASOPHILS # BLD AUTO: 0.03 X10(3)/MCL
BASOPHILS NFR BLD AUTO: 0.5 %
BILIRUB UR QL STRIP.AUTO: NEGATIVE MG/DL
BILIRUBIN DIRECT+TOT PNL SERPL-MCNC: 0.4 MG/DL
BUN SERPL-MCNC: 8.8 MG/DL (ref 7–18.7)
CALCIUM SERPL-MCNC: 10.2 MG/DL (ref 8.4–10.2)
CHLORIDE SERPL-SCNC: 110 MMOL/L (ref 98–107)
CLUE CELLS VAG QL WET PREP: ABNORMAL
CO2 SERPL-SCNC: 22 MMOL/L (ref 22–29)
COLOR UR: YELLOW
CREAT SERPL-MCNC: 0.89 MG/DL (ref 0.55–1.02)
EOSINOPHIL # BLD AUTO: 0.06 X10(3)/MCL (ref 0–0.9)
EOSINOPHIL NFR BLD AUTO: 1 %
ERYTHROCYTE [DISTWIDTH] IN BLOOD BY AUTOMATED COUNT: 14.5 % (ref 11.5–17)
FLUAV AG UPPER RESP QL IA.RAPID: NOT DETECTED
FLUBV AG UPPER RESP QL IA.RAPID: NOT DETECTED
GFR SERPLBLD CREATININE-BSD FMLA CKD-EPI: >60 MLS/MIN/1.73/M2
GLOBULIN SER-MCNC: 3.4 GM/DL (ref 2.4–3.5)
GLUCOSE SERPL-MCNC: 97 MG/DL (ref 74–100)
GLUCOSE UR QL STRIP.AUTO: NORMAL MG/DL
HCT VFR BLD AUTO: 41.4 % (ref 37–47)
HGB BLD-MCNC: 12.9 G/DL (ref 12–16)
HYALINE CASTS #/AREA URNS LPF: ABNORMAL /LPF
IMM GRANULOCYTES # BLD AUTO: 0.01 X10(3)/MCL (ref 0–0.04)
IMM GRANULOCYTES NFR BLD AUTO: 0.2 %
KETONES UR QL STRIP.AUTO: NEGATIVE MG/DL
LACTATE SERPL-SCNC: 1.7 MMOL/L (ref 0.5–2.2)
LEUKOCYTE ESTERASE UR QL STRIP.AUTO: 25 UNIT/L
LIPASE SERPL-CCNC: 9 U/L
LYMPHOCYTES # BLD AUTO: 1.39 X10(3)/MCL (ref 0.6–4.6)
LYMPHOCYTES NFR BLD AUTO: 22.3 %
MAGNESIUM SERPL-MCNC: 2.2 MG/DL (ref 1.6–2.6)
MCH RBC QN AUTO: 26.3 PG (ref 27–31)
MCHC RBC AUTO-ENTMCNC: 31.2 G/DL (ref 33–36)
MCV RBC AUTO: 84.3 FL (ref 80–94)
MONOCYTES # BLD AUTO: 0.46 X10(3)/MCL (ref 0.1–1.3)
MONOCYTES NFR BLD AUTO: 7.4 %
MUCOUS THREADS URNS QL MICRO: ABNORMAL /LPF
NEUTROPHILS # BLD AUTO: 4.27 X10(3)/MCL (ref 2.1–9.2)
NEUTROPHILS NFR BLD AUTO: 68.6 %
NITRITE UR QL STRIP.AUTO: NEGATIVE
NRBC BLD AUTO-RTO: 0 %
PH UR STRIP.AUTO: 7 [PH]
PHOSPHATE SERPL-MCNC: 3 MG/DL (ref 2.3–4.7)
PLATELET # BLD AUTO: 355 X10(3)/MCL (ref 130–400)
PMV BLD AUTO: 10.6 FL (ref 7.4–10.4)
POTASSIUM SERPL-SCNC: 4.4 MMOL/L (ref 3.5–5.1)
PROT SERPL-MCNC: 8.1 GM/DL (ref 6.4–8.3)
PROT UR QL STRIP.AUTO: NEGATIVE MG/DL
RBC # BLD AUTO: 4.91 X10(6)/MCL (ref 4.2–5.4)
RBC #/AREA URNS AUTO: ABNORMAL /HPF
RBC UR QL AUTO: NEGATIVE UNIT/L
RSV A 5' UTR RNA NPH QL NAA+PROBE: NOT DETECTED
SARS-COV-2 RNA RESP QL NAA+PROBE: NOT DETECTED
SODIUM SERPL-SCNC: 140 MMOL/L (ref 136–145)
SP GR UR STRIP.AUTO: 1.01
SQUAMOUS #/AREA URNS LPF: ABNORMAL /HPF
T VAGINALIS VAG QL WET PREP: ABNORMAL
TSH SERPL-ACNC: 1.38 UIU/ML (ref 0.35–4.94)
UROBILINOGEN UR STRIP-ACNC: NORMAL MG/DL
WBC # SPEC AUTO: 6.22 X10(3)/MCL (ref 4.5–11.5)
WBC #/AREA URNS AUTO: ABNORMAL /HPF
WBC #/AREA VAG WET PREP: ABNORMAL
YEAST SPEC QL WET PREP: ABNORMAL

## 2023-05-24 PROCEDURE — 80053 COMPREHEN METABOLIC PANEL: CPT | Performed by: STUDENT IN AN ORGANIZED HEALTH CARE EDUCATION/TRAINING PROGRAM

## 2023-05-24 PROCEDURE — 84703 CHORIONIC GONADOTROPIN ASSAY: CPT | Performed by: STUDENT IN AN ORGANIZED HEALTH CARE EDUCATION/TRAINING PROGRAM

## 2023-05-24 PROCEDURE — 83735 ASSAY OF MAGNESIUM: CPT | Performed by: STUDENT IN AN ORGANIZED HEALTH CARE EDUCATION/TRAINING PROGRAM

## 2023-05-24 PROCEDURE — 84443 ASSAY THYROID STIM HORMONE: CPT | Performed by: STUDENT IN AN ORGANIZED HEALTH CARE EDUCATION/TRAINING PROGRAM

## 2023-05-24 PROCEDURE — 85025 COMPLETE CBC W/AUTO DIFF WBC: CPT | Performed by: STUDENT IN AN ORGANIZED HEALTH CARE EDUCATION/TRAINING PROGRAM

## 2023-05-24 PROCEDURE — 99284 EMERGENCY DEPT VISIT MOD MDM: CPT | Mod: 25

## 2023-05-24 PROCEDURE — 0241U COVID/RSV/FLU A&B PCR: CPT | Performed by: STUDENT IN AN ORGANIZED HEALTH CARE EDUCATION/TRAINING PROGRAM

## 2023-05-24 PROCEDURE — 83690 ASSAY OF LIPASE: CPT | Performed by: STUDENT IN AN ORGANIZED HEALTH CARE EDUCATION/TRAINING PROGRAM

## 2023-05-24 PROCEDURE — 83605 ASSAY OF LACTIC ACID: CPT | Performed by: STUDENT IN AN ORGANIZED HEALTH CARE EDUCATION/TRAINING PROGRAM

## 2023-05-24 PROCEDURE — 87210 SMEAR WET MOUNT SALINE/INK: CPT | Performed by: STUDENT IN AN ORGANIZED HEALTH CARE EDUCATION/TRAINING PROGRAM

## 2023-05-24 PROCEDURE — 84100 ASSAY OF PHOSPHORUS: CPT | Performed by: STUDENT IN AN ORGANIZED HEALTH CARE EDUCATION/TRAINING PROGRAM

## 2023-05-24 PROCEDURE — 81001 URINALYSIS AUTO W/SCOPE: CPT | Performed by: STUDENT IN AN ORGANIZED HEALTH CARE EDUCATION/TRAINING PROGRAM

## 2023-05-24 RX ORDER — METRONIDAZOLE 500 MG/1
500 TABLET ORAL EVERY 12 HOURS
Qty: 14 TABLET | Refills: 0 | Status: SHIPPED | OUTPATIENT
Start: 2023-05-24 | End: 2023-05-31
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
## 2023-05-24 NOTE — Clinical Note
"Rosanne Dai (Kelsey) was seen and treated in our emergency department on 5/24/2023.  She may return to work on 05/26/2023.       If you have any questions or concerns, please don't hesitate to call.      TAMAR Short RN    "

## 2023-08-11 ENCOUNTER — OFFICE VISIT (OUTPATIENT)
Dept: URGENT CARE | Facility: CLINIC | Age: 28
End: 2023-08-11
Payer: MEDICAID

## 2023-08-11 VITALS
DIASTOLIC BLOOD PRESSURE: 89 MMHG | HEART RATE: 59 BPM | BODY MASS INDEX: 23.56 KG/M2 | SYSTOLIC BLOOD PRESSURE: 131 MMHG | TEMPERATURE: 98 F | WEIGHT: 138 LBS | OXYGEN SATURATION: 100 % | HEIGHT: 64 IN | RESPIRATION RATE: 16 BRPM

## 2023-08-11 DIAGNOSIS — H92.03 OTALGIA OF BOTH EARS: ICD-10-CM

## 2023-08-11 DIAGNOSIS — N89.8 VAGINAL DISCHARGE: Primary | ICD-10-CM

## 2023-08-11 LAB
C TRACH DNA SPEC QL NAA+PROBE: NOT DETECTED
CLUE CELLS VAG QL WET PREP: ABNORMAL
HAV IGM SERPL QL IA: NONREACTIVE
HBV CORE IGM SERPL QL IA: NONREACTIVE
HBV SURFACE AG SERPL QL IA: NONREACTIVE
HCV AB SERPL QL IA: NONREACTIVE
HIV 1+2 AB+HIV1 P24 AG SERPL QL IA: NONREACTIVE
N GONORRHOEA DNA SPEC QL NAA+PROBE: NOT DETECTED
SOURCE (OHS): NORMAL
T PALLIDUM AB SER QL: NONREACTIVE
T VAGINALIS VAG QL WET PREP: ABNORMAL
WBC #/AREA VAG WET PREP: ABNORMAL
YEAST SPEC QL WET PREP: ABNORMAL

## 2023-08-11 PROCEDURE — 86780 TREPONEMA PALLIDUM: CPT | Performed by: FAMILY MEDICINE

## 2023-08-11 PROCEDURE — 99214 OFFICE O/P EST MOD 30 MIN: CPT | Mod: S$PBB,,, | Performed by: FAMILY MEDICINE

## 2023-08-11 PROCEDURE — 99214 PR OFFICE/OUTPT VISIT, EST, LEVL IV, 30-39 MIN: ICD-10-PCS | Mod: S$PBB,,, | Performed by: FAMILY MEDICINE

## 2023-08-11 PROCEDURE — 87389 HIV-1 AG W/HIV-1&-2 AB AG IA: CPT | Performed by: FAMILY MEDICINE

## 2023-08-11 PROCEDURE — 99213 OFFICE O/P EST LOW 20 MIN: CPT | Mod: PBBFAC | Performed by: FAMILY MEDICINE

## 2023-08-11 PROCEDURE — 87210 SMEAR WET MOUNT SALINE/INK: CPT | Performed by: FAMILY MEDICINE

## 2023-08-11 PROCEDURE — 87591 N.GONORRHOEAE DNA AMP PROB: CPT | Performed by: FAMILY MEDICINE

## 2023-08-11 PROCEDURE — 80074 ACUTE HEPATITIS PANEL: CPT | Performed by: FAMILY MEDICINE

## 2023-08-11 PROCEDURE — 36415 COLL VENOUS BLD VENIPUNCTURE: CPT | Performed by: FAMILY MEDICINE

## 2023-08-11 RX ORDER — TOPIRAMATE 100 MG/1
100 TABLET, FILM COATED ORAL
COMMUNITY
Start: 2023-04-21

## 2023-08-11 RX ORDER — FLUTICASONE PROPIONATE 50 MCG
2 SPRAY, SUSPENSION (ML) NASAL DAILY
Qty: 16 G | Refills: 2 | Status: SHIPPED | OUTPATIENT
Start: 2023-08-11

## 2023-08-11 RX ORDER — VORTIOXETINE 10 MG/1
10 TABLET, FILM COATED ORAL
COMMUNITY
Start: 2023-06-12

## 2023-08-11 RX ORDER — ROSUVASTATIN CALCIUM 5 MG/1
5 TABLET, COATED ORAL
COMMUNITY
Start: 2023-05-25

## 2023-08-11 NOTE — LETTER
August 11, 2023      [facility]  [address] [facility] [address]  Phone: [phone]       Patient: Rosanne Dai   YOB: 1995  Date of Visit: 08/11/2023    To Whom It May Concern:    Breezy Dai  was at [facility] on 08/11/2023. The patient may return to work/school on AUG 12 2023 with no restrictions. If you have any questions or concerns, or if I can be of further assistance, please do not hesitate to contact me.    Sincerely,    DANGELO STALLINGS MD

## 2023-08-11 NOTE — PROGRESS NOTES
"Subjective:       Patient ID: Rosanne Dai is a 28 y.o. female.    Chief Complaint: Otalgia (Bilat x 2days), std screen, and Vaginal Discharge (X 1wk)      Otalgia     Vaginal Discharge  The patient's primary symptoms include vaginal discharge.     28-year-old female with bilateral ear pain for 2 days and vaginal discharge for 1 week.  No known sick contacts.  She would like to be tested for STDs.  Review of Systems   HENT:  Positive for ear pain.    Genitourinary:  Positive for vaginal discharge.         Objective:       Vital Signs  Temp: 98.2 °F (36.8 °C)  Pulse: (!) 59  Resp: 16  SpO2: 100 %  BP: 131/89  Pain Score: 10-Worst pain ever  Pain Loc: Ear  Height and Weight  Height: 5' 3.78" (162 cm)  Weight: 62.6 kg (138 lb 0.1 oz)  BSA (Calculated - sq m): 1.68 sq meters  BMI (Calculated): 23.9  Weight in (lb) to have BMI = 25: 144.3]  Physical Exam  Constitutional:       Appearance: Normal appearance.   HENT:      Head: Normocephalic and atraumatic.      Right Ear: Tympanic membrane and ear canal normal.      Left Ear: Tympanic membrane and ear canal normal.      Nose: Congestion present. No rhinorrhea.      Mouth/Throat:      Pharynx: Posterior oropharyngeal erythema present. No oropharyngeal exudate.   Eyes:      Extraocular Movements: Extraocular movements intact.      Conjunctiva/sclera: Conjunctivae normal.   Cardiovascular:      Rate and Rhythm: Normal rate and regular rhythm.      Heart sounds: Normal heart sounds.   Pulmonary:      Breath sounds: Normal breath sounds.   Skin:     General: Skin is warm and dry.   Neurological:      General: No focal deficit present.      Mental Status: She is alert.   Psychiatric:         Mood and Affect: Mood and affect normal.         Speech: Speech normal.         Behavior: Behavior normal. Behavior is cooperative.         Thought Content: Thought content does not include homicidal or suicidal ideation.         Assessment:       Problem List Items Addressed This Visit  "   None  Visit Diagnoses       Vaginal discharge    -  Primary    Relevant Orders    Chlamydia/GC, PCR    HIV 1/2 Ag/Ab (4th Gen)    SYPHILIS ANTIBODY (WITH REFLEX RPR)    Hepatitis Panel, Acute    Wet Prep, Genital    Otalgia of both ears        Relevant Medications    fluticasone propionate (FLONASE) 50 mcg/actuation nasal spray            Plan:   Labs pending  Encouraged antihistamines as needed  Safe sex precautions  ER precautions   FU with PCP

## 2023-08-15 LAB — PATH REV: NORMAL

## 2023-09-02 ENCOUNTER — OFFICE VISIT (OUTPATIENT)
Dept: URGENT CARE | Facility: CLINIC | Age: 28
End: 2023-09-02
Payer: MEDICAID

## 2023-09-02 VITALS
BODY MASS INDEX: 24.34 KG/M2 | DIASTOLIC BLOOD PRESSURE: 68 MMHG | WEIGHT: 137.38 LBS | OXYGEN SATURATION: 100 % | TEMPERATURE: 98 F | HEIGHT: 63 IN | HEART RATE: 58 BPM | SYSTOLIC BLOOD PRESSURE: 108 MMHG | RESPIRATION RATE: 18 BRPM

## 2023-09-02 DIAGNOSIS — J01.10 ACUTE FRONTAL SINUSITIS, RECURRENCE NOT SPECIFIED: ICD-10-CM

## 2023-09-02 DIAGNOSIS — R51.9 FRONTAL HEADACHE: Primary | ICD-10-CM

## 2023-09-02 DIAGNOSIS — J02.9 SORE THROAT: ICD-10-CM

## 2023-09-02 LAB
CTP QC/QA: YES
CTP QC/QA: YES
MOLECULAR STREP A: NEGATIVE
SARS-COV-2 RDRP RESP QL NAA+PROBE: NEGATIVE

## 2023-09-02 PROCEDURE — 99213 OFFICE O/P EST LOW 20 MIN: CPT | Mod: S$PBB,,, | Performed by: NURSE PRACTITIONER

## 2023-09-02 PROCEDURE — 87651 STREP A DNA AMP PROBE: CPT | Mod: PBBFAC | Performed by: NURSE PRACTITIONER

## 2023-09-02 PROCEDURE — 99214 OFFICE O/P EST MOD 30 MIN: CPT | Mod: PBBFAC | Performed by: NURSE PRACTITIONER

## 2023-09-02 PROCEDURE — 99213 PR OFFICE/OUTPT VISIT, EST, LEVL III, 20-29 MIN: ICD-10-PCS | Mod: S$PBB,,, | Performed by: NURSE PRACTITIONER

## 2023-09-02 PROCEDURE — 63600175 PHARM REV CODE 636 W HCPCS: Performed by: NURSE PRACTITIONER

## 2023-09-02 PROCEDURE — 87635 SARS-COV-2 COVID-19 AMP PRB: CPT | Mod: PBBFAC | Performed by: NURSE PRACTITIONER

## 2023-09-02 RX ORDER — SERTRALINE HYDROCHLORIDE 200 MG/1
1 CAPSULE ORAL DAILY
COMMUNITY
Start: 2023-05-14

## 2023-09-02 RX ORDER — KETOROLAC TROMETHAMINE 30 MG/ML
60 INJECTION, SOLUTION INTRAMUSCULAR; INTRAVENOUS
Status: COMPLETED | OUTPATIENT
Start: 2023-09-02 | End: 2023-09-02

## 2023-09-02 RX ADMIN — KETOROLAC TROMETHAMINE 60 MG: 30 INJECTION, SOLUTION INTRAMUSCULAR at 02:09

## 2023-09-02 NOTE — PATIENT INSTRUCTIONS
Many over-the-coutner products are available to treat headaches at home. Warm compresses, TENS units, Peppermint Oil, etc...  Tylenol and Ibuprofen may be taken at the same time.    Drink water all day, eat at least 3 meals per day.  Sleep at least 7 hours at night.  Get regular exercise.    Discussed need for f/u with PCP and possible Neurology consult, and also discussed the need for eye exam to r/o eye strain.   F/u with PCP to discuss treatment of chronic headache  Continue Topamax as prescribed.     If you notice you are feeling confused, having trouble speaking or understanding others, having severe headache or head pressure, off balance or falling, nauseated or vomiting, call 911 and go to the ER.

## 2023-09-02 NOTE — LETTER
September 2, 2023      [facility]  [facility] [facility] [address]  Phone: [phone]       Patient: Rosanne Dai   YOB: 1995  Date of Visit: 09/02/2023    To Whom It May Concern:    Breezy Dai  was at [facility] on 09/02/2023. The patient may return to work/school on 09/04/2023 with no restrictions. If you have any questions or concerns, or if I can be of further assistance, please do not hesitate to contact me.    Sincerely,      Farida Byrd NP

## 2023-09-02 NOTE — NURSING
Ketorolac administed to Right Gluteal using aseptic technique. Patient observed for 15 minutes for reactions, none noted. Patient tolerated well.

## 2023-09-02 NOTE — PROGRESS NOTES
"Subjective:      Patient ID: Rosanne Dai is a 28 y.o. female.    Vitals:  height is 5' 3" (1.6 m) and weight is 62.3 kg (137 lb 6.4 oz). Her oral temperature is 98.2 °F (36.8 °C). Her blood pressure is 108/68 and her pulse is 58 (abnormal). Her respiration is 18 and oxygen saturation is 100%.     Chief Complaint: Headache and Sore Throat    HPI Presents with c/o headache and sore throat one month, states "since last visit". Headache is described as bilateral constant and dull  Pt reports chronic headache and states " I'm not going to take mediation for symptoms if its not prescribed to me". Is currently prescribed Topamax ,last dose last night with pt reporting minimal relief " it helps me sleep".  Pt hx of flu B 7/11/2023. PT denies fever, abd pain, N/v/d. LMP 8/18/2023.     HENT:  Positive for sore throat.    Neurological:  Positive for headaches.      Objective:     Physical Exam   Constitutional: She is oriented to person, place, and time.  Non-toxic appearance. She does not appear ill. No distress.   HENT:   Head: Normocephalic.   Ears:   Right Ear: Tympanic membrane normal.   Left Ear: Tympanic membrane normal.   Nose: Sinus tenderness present. No rhinorrhea or congestion. Right sinus exhibits frontal sinus tenderness. Right sinus exhibits no maxillary sinus tenderness. Left sinus exhibits frontal sinus tenderness. Left sinus exhibits no maxillary sinus tenderness.   Mouth/Throat: Mucous membranes are moist. No oropharyngeal exudate or posterior oropharyngeal erythema.   Eyes: Conjunctivae are normal.   Cardiovascular: Normal rate and normal pulses.   Pulmonary/Chest: Effort normal and breath sounds normal. No stridor. No respiratory distress. She has no wheezes. She has no rhonchi. She has no rales. She exhibits no tenderness.   Abdominal: Normal appearance and bowel sounds are normal. She exhibits no distension. Soft. There is no abdominal tenderness.   Musculoskeletal: Normal range of motion.         " General: Normal range of motion.   Lymphadenopathy:     She has no cervical adenopathy.   Neurological: no focal deficit. She is alert and oriented to person, place, and time.   Skin: Skin is warm, dry and not diaphoretic. Capillary refill takes less than 2 seconds.   Psychiatric: Her behavior is normal. Mood, judgment and thought content normal.   Nursing note and vitals reviewed.      Assessment:     1. Frontal headache    2. Sore throat    3. Acute frontal sinusitis, recurrence not specified      Results for orders placed or performed in visit on 09/02/23   POCT COVID-19 Rapid Screening   Result Value Ref Range    POC Rapid COVID Negative Negative     Acceptable Yes    POCT Strep A, Molecular   Result Value Ref Range    Molecular Strep A, POC Negative Negative     Acceptable Yes          Plan:   F/u with PCP to discuss treatment of chronic headache  Continue Topamax as prescribed    Discussed need for f/u w ith PCP and possible Neurology consult, and also discussed the need for eye exam to r/o eye strain.    ER precautions    Frontal headache  -     POCT COVID-19 Rapid Screening    Sore throat  -     POCT Strep A, Molecular    Acute frontal sinusitis, recurrence not specified    Other orders  -     ketorolac injection 60 mg

## 2024-01-31 ENCOUNTER — OFFICE VISIT (OUTPATIENT)
Dept: URGENT CARE | Facility: CLINIC | Age: 29
End: 2024-01-31
Payer: MEDICAID

## 2024-01-31 VITALS
HEIGHT: 63 IN | TEMPERATURE: 98 F | OXYGEN SATURATION: 100 % | RESPIRATION RATE: 16 BRPM | WEIGHT: 160.63 LBS | SYSTOLIC BLOOD PRESSURE: 128 MMHG | BODY MASS INDEX: 28.46 KG/M2 | DIASTOLIC BLOOD PRESSURE: 72 MMHG | HEART RATE: 73 BPM

## 2024-01-31 DIAGNOSIS — N89.8 VAGINAL DISCHARGE: Primary | ICD-10-CM

## 2024-01-31 DIAGNOSIS — B96.89 BACTERIAL VAGINOSIS: ICD-10-CM

## 2024-01-31 DIAGNOSIS — Z32.02 NEGATIVE PREGNANCY TEST: ICD-10-CM

## 2024-01-31 DIAGNOSIS — R10.9 ABDOMINAL CRAMPING: ICD-10-CM

## 2024-01-31 DIAGNOSIS — Z20.2 POSSIBLE EXPOSURE TO STD: ICD-10-CM

## 2024-01-31 DIAGNOSIS — N76.0 BACTERIAL VAGINOSIS: ICD-10-CM

## 2024-01-31 LAB
B-HCG UR QL: NEGATIVE
BILIRUB UR QL STRIP: NEGATIVE
C TRACH DNA SPEC QL NAA+PROBE: NOT DETECTED
CLUE CELLS VAG QL WET PREP: ABNORMAL
CTP QC/QA: YES
GLUCOSE UR QL STRIP: NEGATIVE
HAV IGM SERPL QL IA: NONREACTIVE
HBV CORE IGM SERPL QL IA: NONREACTIVE
HBV SURFACE AG SERPL QL IA: NONREACTIVE
HCV AB SERPL QL IA: NONREACTIVE
HIV 1+2 AB+HIV1 P24 AG SERPL QL IA: NONREACTIVE
KETONES UR QL STRIP: NEGATIVE
LEUKOCYTE ESTERASE UR QL STRIP: NEGATIVE
N GONORRHOEA DNA SPEC QL NAA+PROBE: NOT DETECTED
PH, POC UA: 7
POC BLOOD, URINE: NEGATIVE
POC NITRATES, URINE: NEGATIVE
PROT UR QL STRIP: NEGATIVE
SOURCE (OHS): NORMAL
SP GR UR STRIP: 1.02 (ref 1–1.03)
T PALLIDUM AB SER QL: NONREACTIVE
T VAGINALIS VAG QL WET PREP: ABNORMAL
UROBILINOGEN UR STRIP-ACNC: 0.2 (ref 0.1–1.1)
WBC #/AREA VAG WET PREP: ABNORMAL
YEAST SPEC QL WET PREP: ABNORMAL

## 2024-01-31 PROCEDURE — 81025 URINE PREGNANCY TEST: CPT | Mod: PBBFAC

## 2024-01-31 PROCEDURE — 87491 CHLMYD TRACH DNA AMP PROBE: CPT

## 2024-01-31 PROCEDURE — 87389 HIV-1 AG W/HIV-1&-2 AB AG IA: CPT

## 2024-01-31 PROCEDURE — 36415 COLL VENOUS BLD VENIPUNCTURE: CPT

## 2024-01-31 PROCEDURE — 87210 SMEAR WET MOUNT SALINE/INK: CPT

## 2024-01-31 PROCEDURE — 99214 OFFICE O/P EST MOD 30 MIN: CPT | Mod: S$PBB,,,

## 2024-01-31 PROCEDURE — 99214 OFFICE O/P EST MOD 30 MIN: CPT | Mod: PBBFAC

## 2024-01-31 PROCEDURE — 86780 TREPONEMA PALLIDUM: CPT

## 2024-01-31 PROCEDURE — 87661 TRICHOMONAS VAGINALIS AMPLIF: CPT

## 2024-01-31 PROCEDURE — 80074 ACUTE HEPATITIS PANEL: CPT

## 2024-01-31 PROCEDURE — 81003 URINALYSIS AUTO W/O SCOPE: CPT | Mod: PBBFAC

## 2024-01-31 RX ORDER — METRONIDAZOLE 500 MG/1
500 TABLET ORAL EVERY 12 HOURS
Qty: 14 TABLET | Refills: 0 | Status: SHIPPED | OUTPATIENT
Start: 2024-01-31 | End: 2024-02-07

## 2024-01-31 NOTE — PROGRESS NOTES
"Subjective:      Patient ID: Rosanne Dai is a 28 y.o. female.    Vitals:  height is 5' 2.99" (1.6 m) and weight is 72.8 kg (160 lb 9.6 oz). Her oral temperature is 98.1 °F (36.7 °C). Her blood pressure is 128/72 and her pulse is 73. Her respiration is 16 and oxygen saturation is 100%.     Chief Complaint: Abdominal Cramping (Started last week) and Vaginal Discharge    Cc as above. States she is sexually active, does not always use protection.      Abdominal Cramping  This is a new problem. The current episode started in the past 7 days. The onset quality is sudden. The problem occurs intermittently. The problem has been unchanged. The pain is located in the suprapubic region. The pain is mild. The quality of the pain is a sensation of fullness. Pertinent negatives include no dysuria, fever, nausea or vomiting. She has tried nothing for the symptoms.   Vaginal Discharge  The patient's primary symptoms include a genital odor and vaginal discharge. The patient's pertinent negatives include no genital itching or genital lesions. This is a new problem. The current episode started in the past 7 days. The problem occurs constantly. The problem has been unchanged. Associated symptoms include abdominal pain. Pertinent negatives include no back pain, chills, dysuria, fever, flank pain, nausea or vomiting. The vaginal discharge was white, yellow and brown. There has been no bleeding. She has tried nothing for the symptoms. She is sexually active. It is unknown whether or not her partner has an STD. Her past medical history is significant for vaginosis.       Constitution: Negative for chills and fever.   Gastrointestinal:  Positive for abdominal pain. Negative for nausea and vomiting.   Genitourinary:  Positive for vaginal discharge. Negative for dysuria and flank pain.   Musculoskeletal:  Negative for back pain.      Objective:     Physical Exam   Constitutional: She is oriented to person, place, and time.  Non-toxic " appearance. She does not appear ill. No distress. normal  HENT:   Head: Normocephalic and atraumatic.   Nose: Nose normal.   Eyes: Conjunctivae are normal.   Neck: Neck supple.   Cardiovascular: Normal rate, regular rhythm, normal heart sounds and normal pulses.   Pulmonary/Chest: Effort normal and breath sounds normal. No respiratory distress. She has no wheezes. She has no rhonchi.   Abdominal: Normal appearance and bowel sounds are normal. Soft. There is abdominal tenderness in the suprapubic area. There is no rebound and no guarding.     Genitourinary:    Vaginal discharge present.           Comments: deferred     Musculoskeletal: Normal range of motion.         General: Normal range of motion.   Neurological: no focal deficit. She is alert and oriented to person, place, and time.   Skin: Skin is warm, dry and not diaphoretic.   Psychiatric: Her behavior is normal. Mood normal.   Nursing note and vitals reviewed.      Assessment:     1. Vaginal discharge    2. Abdominal cramping    3. Negative pregnancy test    4. Possible exposure to STD      Results for orders placed or performed in visit on 01/31/24   POCT Urinalysis, Dipstick, Automated, W/O Scope   Result Value Ref Range    POC Blood, Urine Negative Negative    POC Bilirubin, Urine Negative Negative    POC Urobilinogen, Urine 0.2 0.1 - 1.1    POC Ketones, Urine Negative Negative    POC Protein, Urine Negative Negative    POC Nitrates, Urine Negative Negative    POC Glucose, Urine Negative Negative    pH, UA 7.0     POC Specific Gravity, Urine 1.025 1.003 - 1.029    POC Leukocytes, Urine Negative Negative   POCT urine pregnancy   Result Value Ref Range    POC Preg Test, Ur Negative Negative     Acceptable Yes          Plan:       Vaginal discharge  -     Wet Prep, Genital  -     Chlamydia/GC, PCR  -     T.vaginalisisc, Amplified RNA    Abdominal cramping  -     POCT Urinalysis, Dipstick, Automated, W/O Scope  -     POCT urine  pregnancy    Negative pregnancy test    Possible exposure to STD  -     Chlamydia/GC, PCR  -     T.vaginalisisc, Amplified RNA  -     Hepatitis Panel, Acute  -     HIV 1/2 Ag/Ab (4th Gen)  -     SYPHILIS ANTIBODY (WITH REFLEX RPR)    No sex until all results are known. The clinic will ONLY call you for POSITIVE = ABNORMAL results. We will not call you to tell you tests are NEGATIVE = NORMAL. If you need medication to treat any conditions, it was either prescribed to you today and sent to your pharmacy, or it will be sent when providers view abnormal results.The clinic will call with ABNORMAL results and a treatment plan. Always use condoms. Partners will need treatment for any POSITIVE STDs on your testing. They have to have their own visit, we do not automatically treat them.  ER precautions provided.

## 2024-01-31 NOTE — PATIENT INSTRUCTIONS
No sex until all results are known. The clinic will ONLY call you for POSITIVE = ABNORMAL results. We will not call you to tell you tests are NEGATIVE = NORMAL. If you need medication to treat any conditions, it was either prescribed to you today and sent to your pharmacy, or it will be sent when providers view abnormal results.The clinic will call with ABNORMAL results and a treatment plan. Always use condoms. Partners will need treatment for any POSITIVE STDs on your testing. They have to have their own visit, we do not automatically treat them. Go to the nearest ER for worsening symptoms, such as severe abdominal pain, chest pain, shortness of breath, palpitations, etc.

## 2024-01-31 NOTE — LETTER
January 31, 2024      [facility]  [facility] [address]  Phone: [phone]       Patient: Rosanne Dai   YOB: 1995  Date of Visit: 01/31/2024    To Whom It May Concern:    Breezy Dai  was at [facility] on 01/31/2024. The patient may return to work/school on 02/01/2024 with no restrictions. If you have any questions or concerns, or if I can be of further assistance, please do not hesitate to contact me.    Sincerely,    RADHA Melendez

## 2024-02-01 ENCOUNTER — TELEPHONE (OUTPATIENT)
Dept: URGENT CARE | Facility: CLINIC | Age: 29
End: 2024-02-01
Payer: COMMERCIAL

## 2024-02-01 LAB
SPECIMEN SOURCE: NORMAL
T VAGINALIS RRNA SPEC QL NAA+PROBE: NEGATIVE

## 2024-02-01 NOTE — TELEPHONE ENCOUNTER
"----- Message from RADHA Melendez sent at 1/31/2024  2:39 PM CST -----  Please notify her that she tested positive for bacterial vaginosis. Rx for metronidazole (500mg by mouth twice a day x 7 days) sent to her pharmacy. "BV". Bacterial Vaginosis is not an STD. It is a pH imbalance in the vagina that can cause discharge, odor, discomfort. Do not take bubble baths, use bath bombs, douches, or any scented tampons/pads. Do not drink any alcohol while on this medication and for 2 days after completing it, it will make you sick (nausea/vomiting/etc).    "

## 2024-05-07 ENCOUNTER — HOSPITAL ENCOUNTER (EMERGENCY)
Facility: HOSPITAL | Age: 29
Discharge: HOME | End: 2024-05-07
Attending: INTERNAL MEDICINE
Payer: COMMERCIAL

## 2024-05-07 VITALS
HEIGHT: 63 IN | BODY MASS INDEX: 29.95 KG/M2 | WEIGHT: 169 LBS | SYSTOLIC BLOOD PRESSURE: 134 MMHG | HEART RATE: 70 BPM | TEMPERATURE: 98 F | RESPIRATION RATE: 20 BRPM | DIASTOLIC BLOOD PRESSURE: 106 MMHG | OXYGEN SATURATION: 98 %

## 2024-05-07 DIAGNOSIS — K29.70 GASTRITIS, PRESENCE OF BLEEDING UNSPECIFIED, UNSPECIFIED CHRONICITY, UNSPECIFIED GASTRITIS TYPE: Primary | ICD-10-CM

## 2024-05-07 LAB
ALBUMIN SERPL-MCNC: 4.2 G/DL (ref 3.5–5)
ALBUMIN/GLOB SERPL: 1.3 RATIO (ref 1.1–2)
ALP SERPL-CCNC: 72 UNIT/L (ref 40–150)
ALT SERPL-CCNC: 15 UNIT/L (ref 0–55)
APPEARANCE UR: ABNORMAL
AST SERPL-CCNC: 18 UNIT/L (ref 5–34)
B-HCG SERPL QL: NEGATIVE
BASOPHILS # BLD AUTO: 0.02 X10(3)/MCL
BASOPHILS NFR BLD AUTO: 0.2 %
BILIRUB SERPL-MCNC: 0.4 MG/DL
BILIRUB UR QL STRIP.AUTO: NEGATIVE
BUN SERPL-MCNC: 13.1 MG/DL (ref 7–18.7)
CALCIUM SERPL-MCNC: 9.1 MG/DL (ref 8.4–10.2)
CHLORIDE SERPL-SCNC: 113 MMOL/L (ref 98–107)
CO2 SERPL-SCNC: 21 MMOL/L (ref 22–29)
COLOR UR AUTO: YELLOW
CREAT SERPL-MCNC: 0.9 MG/DL (ref 0.55–1.02)
EOSINOPHIL # BLD AUTO: 0.1 X10(3)/MCL (ref 0–0.9)
EOSINOPHIL NFR BLD AUTO: 1.1 %
ERYTHROCYTE [DISTWIDTH] IN BLOOD BY AUTOMATED COUNT: 13.7 % (ref 11.5–17)
GFR SERPLBLD CREATININE-BSD FMLA CKD-EPI: >60 ML/MIN/1.73/M2
GLOBULIN SER-MCNC: 3.3 GM/DL (ref 2.4–3.5)
GLUCOSE SERPL-MCNC: 85 MG/DL (ref 74–100)
GLUCOSE UR QL STRIP.AUTO: NEGATIVE
HCT VFR BLD AUTO: 38.8 % (ref 37–47)
HGB BLD-MCNC: 12.5 G/DL (ref 12–16)
IMM GRANULOCYTES # BLD AUTO: 0.02 X10(3)/MCL (ref 0–0.04)
IMM GRANULOCYTES NFR BLD AUTO: 0.2 %
KETONES UR QL STRIP.AUTO: NEGATIVE
LEUKOCYTE ESTERASE UR QL STRIP.AUTO: NEGATIVE
LIPASE SERPL-CCNC: 12 U/L
LYMPHOCYTES # BLD AUTO: 1.03 X10(3)/MCL (ref 0.6–4.6)
LYMPHOCYTES NFR BLD AUTO: 10.8 %
MCH RBC QN AUTO: 28.1 PG (ref 27–31)
MCHC RBC AUTO-ENTMCNC: 32.2 G/DL (ref 33–36)
MCV RBC AUTO: 87.2 FL (ref 80–94)
MONOCYTES # BLD AUTO: 0.58 X10(3)/MCL (ref 0.1–1.3)
MONOCYTES NFR BLD AUTO: 6.1 %
NEUTROPHILS # BLD AUTO: 7.76 X10(3)/MCL (ref 2.1–9.2)
NEUTROPHILS NFR BLD AUTO: 81.6 %
NITRITE UR QL STRIP.AUTO: NEGATIVE
NRBC BLD AUTO-RTO: 0 %
PH UR STRIP.AUTO: 7 [PH]
PLATELET # BLD AUTO: 325 X10(3)/MCL (ref 130–400)
PMV BLD AUTO: 10.7 FL (ref 7.4–10.4)
POTASSIUM SERPL-SCNC: 4 MMOL/L (ref 3.5–5.1)
PROT SERPL-MCNC: 7.5 GM/DL (ref 6.4–8.3)
PROT UR QL STRIP.AUTO: NEGATIVE
RBC # BLD AUTO: 4.45 X10(6)/MCL (ref 4.2–5.4)
RBC UR QL AUTO: NEGATIVE
SODIUM SERPL-SCNC: 141 MMOL/L (ref 136–145)
SP GR UR STRIP.AUTO: 1.02 (ref 1–1.03)
TROPONIN I SERPL-MCNC: <0.01 NG/ML (ref 0–0.04)
UROBILINOGEN UR STRIP-ACNC: 0.2
WBC # SPEC AUTO: 9.51 X10(3)/MCL (ref 4.5–11.5)

## 2024-05-07 PROCEDURE — 96361 HYDRATE IV INFUSION ADD-ON: CPT

## 2024-05-07 PROCEDURE — 99284 EMERGENCY DEPT VISIT MOD MDM: CPT | Mod: 25

## 2024-05-07 PROCEDURE — 80053 COMPREHEN METABOLIC PANEL: CPT | Performed by: PHYSICIAN ASSISTANT

## 2024-05-07 PROCEDURE — 85025 COMPLETE CBC W/AUTO DIFF WBC: CPT | Performed by: PHYSICIAN ASSISTANT

## 2024-05-07 PROCEDURE — 25000003 PHARM REV CODE 250: Performed by: PHYSICIAN ASSISTANT

## 2024-05-07 PROCEDURE — 63600175 PHARM REV CODE 636 W HCPCS: Performed by: PHYSICIAN ASSISTANT

## 2024-05-07 PROCEDURE — 96374 THER/PROPH/DIAG INJ IV PUSH: CPT

## 2024-05-07 PROCEDURE — 84484 ASSAY OF TROPONIN QUANT: CPT | Performed by: PHYSICIAN ASSISTANT

## 2024-05-07 PROCEDURE — 81003 URINALYSIS AUTO W/O SCOPE: CPT | Performed by: INTERNAL MEDICINE

## 2024-05-07 PROCEDURE — C9113 INJ PANTOPRAZOLE SODIUM, VIA: HCPCS | Performed by: PHYSICIAN ASSISTANT

## 2024-05-07 PROCEDURE — 81025 URINE PREGNANCY TEST: CPT | Performed by: INTERNAL MEDICINE

## 2024-05-07 PROCEDURE — 83690 ASSAY OF LIPASE: CPT | Performed by: PHYSICIAN ASSISTANT

## 2024-05-07 RX ORDER — ONDANSETRON 4 MG/1
4 TABLET, ORALLY DISINTEGRATING ORAL EVERY 8 HOURS PRN
Qty: 20 TABLET | Refills: 0 | Status: SHIPPED | OUTPATIENT
Start: 2024-05-07

## 2024-05-07 RX ORDER — PANTOPRAZOLE SODIUM 40 MG/10ML
40 INJECTION, POWDER, LYOPHILIZED, FOR SOLUTION INTRAVENOUS
Status: COMPLETED | OUTPATIENT
Start: 2024-05-07 | End: 2024-05-07

## 2024-05-07 RX ORDER — SUCRALFATE 1 G/10ML
1 SUSPENSION ORAL 4 TIMES DAILY
Qty: 414 ML | Refills: 0 | Status: SHIPPED | OUTPATIENT
Start: 2024-05-07

## 2024-05-07 RX ORDER — PANTOPRAZOLE SODIUM 40 MG/1
40 TABLET, DELAYED RELEASE ORAL DAILY
Qty: 30 TABLET | Refills: 0 | Status: SHIPPED | OUTPATIENT
Start: 2024-05-07 | End: 2024-06-06

## 2024-05-07 RX ORDER — DICYCLOMINE HYDROCHLORIDE 20 MG/1
20 TABLET ORAL 4 TIMES DAILY
Qty: 28 TABLET | Refills: 0 | Status: SHIPPED | OUTPATIENT
Start: 2024-05-07 | End: 2024-05-14

## 2024-05-07 RX ADMIN — PANTOPRAZOLE SODIUM 40 MG: 40 INJECTION, POWDER, LYOPHILIZED, FOR SOLUTION INTRAVENOUS at 04:05

## 2024-05-07 RX ADMIN — SODIUM CHLORIDE 1000 ML: 9 INJECTION, SOLUTION INTRAVENOUS at 04:05

## 2024-05-07 NOTE — Clinical Note
"Rosanne Delatorre"Suhas was seen and treated in our emergency department on 5/7/2024.  She may return to school on 05/08/2024.      If you have any questions or concerns, please don't hesitate to call.      Becca Parrish PA"

## 2024-05-07 NOTE — Clinical Note
"Rosanne Delatorre" Suhas was seen and treated in our emergency department on 5/7/2024.  She may return to work on 05/08/2024.       If you have any questions or concerns, please don't hesitate to call.      Becca Parrish PA"

## 2024-05-07 NOTE — DISCHARGE INSTRUCTIONS
Drink plenty of fluids.  Eat a bland diet over the next 2-3 days.  Use Carafate to coat the inside of your stomach.  Take Protonix daily.  Use Bentyl for abdominal cramping.  Use Zofran for nausea and vomiting.  Follow up with GI and PCP.

## 2024-05-07 NOTE — ED PROVIDER NOTES
Encounter Date: 5/7/2024       History     Chief Complaint   Patient presents with    Abdominal Pain     Reports of generalized abdominal pain, reflux, N/V/D that started today. Patient has been having similar symptoms on and off for a year and a half. Denies fevers.     29-year-old female presents to ED for evaluation of generalized pain with nausea and vomiting intermittent over the past several months.  Patient reports that she feels like an acid reflux.  States she feels like she was burping up eggs.  Reports to vomiting bile.  States last bowel movement was this morning.  Denies any fever.  States she has a history of hiatal hernia but has not followed up with GI or surgery.  Denies any urinary complaints    The history is provided by the patient. No  was used.     Review of patient's allergies indicates:   Allergen Reactions    Sulfamethoxazole-trimethoprim Rash     Past Medical History:   Diagnosis Date    Anxiety disorder, unspecified     Depression     High cholesterol      No past surgical history on file.  Family History   Problem Relation Name Age of Onset    Anxiety disorder Mother      Coronary artery disease Father       Social History     Tobacco Use    Smoking status: Every Day     Types: Cigarettes    Smokeless tobacco: Never   Substance Use Topics    Alcohol use: Yes     Comment: occ    Drug use: Not Currently     Types: Marijuana     Review of Systems   Constitutional:  Negative for chills, fatigue and fever.   Respiratory:  Negative for shortness of breath.    Cardiovascular:  Negative for chest pain.   Gastrointestinal:  Positive for abdominal pain, nausea and vomiting. Negative for diarrhea.   Genitourinary:  Negative for dysuria, flank pain, frequency, pelvic pain and urgency.   Musculoskeletal:  Negative for myalgias.   All other systems reviewed and are negative.      Physical Exam     Initial Vitals [05/07/24 1513]   BP Pulse Resp Temp SpO2   (!) 160/101 69 20 98.1 °F  (36.7 °C) 98 %      MAP       --         Physical Exam    Vitals reviewed.  Constitutional: She appears well-developed.   HENT:   Head: Normocephalic and atraumatic.   Right Ear: Tympanic membrane and external ear normal.   Left Ear: Tympanic membrane and external ear normal.   Mouth/Throat: Uvula is midline, oropharynx is clear and moist and mucous membranes are normal. No trismus in the jaw. No uvula swelling. No oropharyngeal exudate, posterior oropharyngeal edema or posterior oropharyngeal erythema.   Eyes: Conjunctivae are normal. Pupils are equal, round, and reactive to light.   Neck: Neck supple.   Normal range of motion.  Cardiovascular:  Normal rate, regular rhythm and normal heart sounds.           Pulmonary/Chest: Breath sounds normal. She has no wheezes. She has no rhonchi. She has no rales.   Abdominal: Abdomen is soft. Bowel sounds are normal. There is no abdominal tenderness. There is no rebound and no guarding.   Musculoskeletal:         General: Normal range of motion.      Cervical back: Normal range of motion and neck supple.     Neurological: She is alert and oriented to person, place, and time.   Skin: Skin is warm and dry.   Psychiatric: She has a normal mood and affect.         ED Course   Procedures  Labs Reviewed   URINALYSIS, REFLEX TO URINE CULTURE - Abnormal; Notable for the following components:       Result Value    Appearance, UA Cloudy (*)     All other components within normal limits   COMPREHENSIVE METABOLIC PANEL - Abnormal; Notable for the following components:    Chloride 113 (*)     Carbon Dioxide 21 (*)     All other components within normal limits   CBC WITH DIFFERENTIAL - Abnormal; Notable for the following components:    MCHC 32.2 (*)     MPV 10.7 (*)     All other components within normal limits   PREGNANCY TEST, URINE RAPID - Normal   LIPASE - Normal   TROPONIN I - Normal   CBC W/ AUTO DIFFERENTIAL    Narrative:     The following orders were created for panel order CBC  auto differential.  Procedure                               Abnormality         Status                     ---------                               -----------         ------                     CBC with Differential[[id number]]       Abnormal            Final result                 Please view results for these tests on the individual orders.          Imaging Results    None          Medications   sodium chloride 0.9% bolus 1,000 mL 1,000 mL (1,000 mLs Intravenous New Bag 5/7/24 1654)   pantoprazole injection 40 mg (40 mg Intravenous Given 5/7/24 1953)     Medical Decision Making  29-year-old female presents to ED for evaluation of generalized pain with nausea and vomiting intermittent over the past several months.  Patient reports that she feels like an acid reflux.  States she feels like she was burping up eggs.  Reports to vomiting bile.  States last bowel movement was this morning.  Denies any fever.  States she has a history of hiatal hernia but has not followed up with GI or surgery.  Denies any urinary complaints    Differential diagnosis includes but isn't limited to GERD, gastritis, gastric ulcer, cholecystitis, appendicitis, UTI, electrolyte abnormality, nausea vomiting, viral syndrome.    Amount and/or Complexity of Data Reviewed  Labs: ordered. Decision-making details documented in ED Course.  Discussion of management or test interpretation with external provider(s): Patient presents to ED for evaluation of generalized abdominal pain with nausea and vomiting intermittent over the last several months.  Patient reports she was intermittent cramping.  Reports that she has been burping up acid and egg like smells.  States she feels like she was vomiting bile.  Denies any diarrhea.  Last bowel movement this morning.  Patient has a history of hiatal hernia.  Symptoms consistent with acid reflux.  Labs obtained no leukocytosis.  Normal lipase and troponin.  No electrolyte abnormality.  Abdomen is soft nontender  nonsurgical.  Using shared decision-making reviewed no indication for CT at this time.  Discussed getting patient into GI.  Will place on PPI as well as Carafate for coverage of GERD and gastritis.  Discussed follow-up.  Discussed return ED precautions.  Patient verbalizes understanding    Risk  Prescription drug management.               ED Course as of 05/07/24 1721 Tue May 07, 2024   1714 WBC: 9.51 [SL]   1715 Lipase: 12 [SL]   1715 Troponin I: <0.010 [SL]   1715 Creatinine: 0.90 [SL]   1715 hCG Qualitative, Urine: Negative [SL]      ED Course User Index  [SL] Becca Parrish PA                           Clinical Impression:  Final diagnoses:  [K29.70] Gastritis, presence of bleeding unspecified, unspecified chronicity, unspecified gastritis type (Primary)          ED Disposition Condition    Discharge Stable          ED Prescriptions       Medication Sig Dispense Start Date End Date Auth. Provider    sucralfate (CARAFATE) 100 mg/mL suspension Take 10 mLs (1 g total) by mouth 4 (four) times daily. 414 mL 5/7/2024 -- Becca Parrish PA    pantoprazole (PROTONIX) 40 MG tablet Take 1 tablet (40 mg total) by mouth once daily. 30 tablet 5/7/2024 6/6/2024 Becca Parrish PA    ondansetron (ZOFRAN-ODT) 4 MG TbDL Take 1 tablet (4 mg total) by mouth every 8 (eight) hours as needed (Nausea). 20 tablet 5/7/2024 -- Becca Parrish PA    dicyclomine (BENTYL) 20 mg tablet Take 1 tablet (20 mg total) by mouth 4 (four) times daily. for 7 days 28 tablet 5/7/2024 5/14/2024 Becca Parrish PA          Follow-up Information       Follow up With Specialties Details Why Contact Info    PCP  In 1 week As needed, If you do not have a PCP you may call [phone] to help get set up If you do not have a PCP you may call [phone] to help get set up.    Rajan Nava MD Gastroenterology   [facility] [address]  [phone]               Becca Parrish PA  05/07/24 1722

## 2024-11-11 ENCOUNTER — HOSPITAL ENCOUNTER (EMERGENCY)
Facility: HOSPITAL | Age: 29
Discharge: HOME | End: 2024-11-11
Attending: EMERGENCY MEDICINE
Payer: COMMERCIAL

## 2024-11-11 VITALS
DIASTOLIC BLOOD PRESSURE: 89 MMHG | HEIGHT: 64 IN | WEIGHT: 203 LBS | RESPIRATION RATE: 18 BRPM | HEART RATE: 80 BPM | BODY MASS INDEX: 34.66 KG/M2 | OXYGEN SATURATION: 100 % | SYSTOLIC BLOOD PRESSURE: 151 MMHG | TEMPERATURE: 98 F

## 2024-11-11 DIAGNOSIS — G44.319 ACUTE POST-TRAUMATIC HEADACHE, NOT INTRACTABLE: ICD-10-CM

## 2024-11-11 DIAGNOSIS — S01.01XA LACERATION OF SCALP, INITIAL ENCOUNTER: Primary | ICD-10-CM

## 2024-11-11 PROCEDURE — 63600175 PHARM REV CODE 636 W HCPCS: Performed by: EMERGENCY MEDICINE

## 2024-11-11 PROCEDURE — 99284 EMERGENCY DEPT VISIT MOD MDM: CPT | Mod: 25

## 2024-11-11 PROCEDURE — 90471 IMMUNIZATION ADMIN: CPT | Performed by: EMERGENCY MEDICINE

## 2024-11-11 PROCEDURE — 90715 TDAP VACCINE 7 YRS/> IM: CPT | Performed by: EMERGENCY MEDICINE

## 2024-11-11 RX ORDER — BUTALBITAL, ACETAMINOPHEN AND CAFFEINE 50; 325; 40 MG/1; MG/1; MG/1
2 TABLET ORAL EVERY 4 HOURS PRN
Qty: 30 TABLET | Refills: 1 | Status: SHIPPED | OUTPATIENT
Start: 2024-11-11 | End: 2024-12-11

## 2024-11-11 RX ADMIN — TETANUS TOXOID, REDUCED DIPHTHERIA TOXOID AND ACELLULAR PERTUSSIS VACCINE, ADSORBED 0.5 ML: 5; 2.5; 8; 8; 2.5 SUSPENSION INTRAMUSCULAR at 06:11

## 2024-11-12 NOTE — ED PROVIDER NOTES
Encounter Date: 11/11/2024       History     Chief Complaint   Patient presents with    Head Laceration     The history is provided by the patient.   Head Laceration  This is a new problem. The current episode started yesterday. The problem occurs constantly. Associated symptoms include headaches. Pertinent negatives include no chest pain and no shortness of breath. Nothing aggravates the symptoms. Nothing relieves the symptoms.   Tripped coming down steps and fell backwards, striking the back of her head.  Notes HA, no LOC, no N/V.    Review of patient's allergies indicates:   Allergen Reactions    Sulfamethoxazole-trimethoprim Rash     Past Medical History:   Diagnosis Date    Anxiety disorder, unspecified     Depression     High cholesterol      No past surgical history on file.  Family History   Problem Relation Name Age of Onset    Anxiety disorder Mother      Coronary artery disease Father       Social History     Tobacco Use    Smoking status: Every Day     Types: Cigarettes    Smokeless tobacco: Never   Substance Use Topics    Alcohol use: Yes     Comment: occ    Drug use: Not Currently     Types: Marijuana     Review of Systems   Constitutional:  Negative for fever.   HENT:  Negative for sore throat.    Respiratory:  Negative for shortness of breath.    Cardiovascular:  Negative for chest pain.   Gastrointestinal:  Negative for nausea.   Genitourinary:  Negative for dysuria.   Musculoskeletal:  Negative for back pain.   Skin:  Negative for rash.   Neurological:  Positive for headaches. Negative for weakness.   Hematological:  Does not bruise/bleed easily.       Physical Exam     Initial Vitals [11/11/24 1843]   BP Pulse Resp Temp SpO2   (!) 151/89 80 18 98.2 °F (36.8 °C) 100 %      MAP       --         Physical Exam    Nursing note and vitals reviewed.  Constitutional: She appears well-developed and well-nourished.   HENT:   Head: Normocephalic and atraumatic.       Right Ear: External ear normal.   Left  Ear: External ear normal.   Eyes: Conjunctivae and EOM are normal. Pupils are equal, round, and reactive to light.   Neck: Neck supple.   Normal range of motion.  Cardiovascular:  Normal rate, regular rhythm, normal heart sounds and intact distal pulses.           Pulmonary/Chest: Breath sounds normal.   Abdominal: Abdomen is soft. Bowel sounds are normal.   Musculoskeletal:         General: Normal range of motion.      Cervical back: Normal range of motion and neck supple.     Neurological: She is alert and oriented to person, place, and time. GCS score is 15. GCS eye subscore is 4. GCS verbal subscore is 5. GCS motor subscore is 6.   Skin: Skin is warm and dry. Capillary refill takes less than 2 seconds.   Psychiatric: She has a normal mood and affect. Her behavior is normal. Judgment and thought content normal.         ED Course   Procedures  Labs Reviewed - No data to display       Imaging Results    None          Medications   Tdap (BOOSTRIX) vaccine injection 0.5 mL (has no administration in time range)     Medical Decision Making  Risk  Prescription drug management.    Differential includes: post-traumatic HA, laceration, concussion, ICH.  No red flags to warrant brain imaging.  Laceration does not require primary closure, as it is very superficial and now 24 hours old.  Encouraged good wound care and will update Tdap and D/C with analgesic                                  Clinical Impression:  Final diagnoses:  [S01.01XA] Laceration of scalp, initial encounter (Primary)  [G44.319] Acute post-traumatic headache, not intractable          ED Disposition Condition    Discharge Stable          ED Prescriptions       Medication Sig Dispense Start Date End Date Auth. Provider    butalbital-acetaminophen-caffeine -40 mg (FIORICET, ESGIC) -40 mg per tablet Take 2 tablets by mouth every 4 (four) hours as needed for Headaches. Maximum 6 tablets in 24 hours 30 tablet 11/11/2024 12/11/2024 Mohsen  Nelson ARANDA MD          Follow-up Information       Follow up With Specialties Details Why Contact Info    Follow up with your primary MD in 3-5 days if not improved.  Return to ED for worsening symptoms.                 Nelson Connolly MD  11/11/24 [id number]

## 2024-11-19 ENCOUNTER — PATIENT MESSAGE (OUTPATIENT)
Dept: RESEARCH | Facility: HOSPITAL | Age: 29
End: 2024-11-19
Payer: COMMERCIAL